# Patient Record
Sex: MALE | Race: AMERICAN INDIAN OR ALASKA NATIVE
[De-identification: names, ages, dates, MRNs, and addresses within clinical notes are randomized per-mention and may not be internally consistent; named-entity substitution may affect disease eponyms.]

---

## 2018-02-17 ENCOUNTER — HOSPITAL ENCOUNTER (EMERGENCY)
Dept: HOSPITAL 95 - ER | Age: 83
Discharge: HOME | End: 2018-02-17
Payer: MEDICARE

## 2018-02-17 VITALS — WEIGHT: 214.99 LBS | HEIGHT: 72 IN | BODY MASS INDEX: 29.12 KG/M2

## 2018-02-17 DIAGNOSIS — Z79.899: ICD-10-CM

## 2018-02-17 DIAGNOSIS — Z79.52: ICD-10-CM

## 2018-02-17 DIAGNOSIS — I10: ICD-10-CM

## 2018-02-17 DIAGNOSIS — Z79.4: ICD-10-CM

## 2018-02-17 DIAGNOSIS — Z88.5: ICD-10-CM

## 2018-02-17 DIAGNOSIS — E78.5: ICD-10-CM

## 2018-02-17 DIAGNOSIS — E11.40: Primary | ICD-10-CM

## 2018-02-17 LAB
ALBUMIN SERPL BCP-MCNC: 3.3 G/DL (ref 3.4–5)
ALBUMIN/GLOB SERPL: 0.8 {RATIO} (ref 0.8–1.8)
ALT SERPL W P-5'-P-CCNC: 22 U/L (ref 12–78)
ANION GAP SERPL CALCULATED.4IONS-SCNC: 7 MMOL/L (ref 6–16)
AST SERPL W P-5'-P-CCNC: 26 U/L (ref 12–37)
BASOPHILS # BLD AUTO: 0.02 K/MM3 (ref 0–0.23)
BASOPHILS NFR BLD AUTO: 0 % (ref 0–2)
BILIRUB SERPL-MCNC: 0.3 MG/DL (ref 0.1–1)
BUN SERPL-MCNC: 94 MG/DL (ref 8–24)
CALCIUM SERPL-MCNC: 8.7 MG/DL (ref 8.5–10.1)
CHLORIDE SERPL-SCNC: 98 MMOL/L (ref 98–108)
CO2 SERPL-SCNC: 34 MMOL/L (ref 21–32)
CREAT SERPL-MCNC: 2.58 MG/DL (ref 0.6–1.2)
DEPRECATED RDW RBC AUTO: 59.3 FL (ref 35.1–46.3)
EOSINOPHIL # BLD AUTO: 0.08 K/MM3 (ref 0–0.68)
EOSINOPHIL NFR BLD AUTO: 1 % (ref 0–6)
ERYTHROCYTE [DISTWIDTH] IN BLOOD BY AUTOMATED COUNT: 16.1 % (ref 11.7–14.2)
GLOBULIN SER CALC-MCNC: 4.3 G/DL (ref 2.2–4)
GLUCOSE SERPL-MCNC: 135 MG/DL (ref 70–99)
HCT VFR BLD AUTO: 30.1 % (ref 37–53)
HGB BLD-MCNC: 9.2 G/DL (ref 13.5–17.5)
IMM GRANULOCYTES # BLD AUTO: 0.03 K/MM3 (ref 0–0.1)
IMM GRANULOCYTES NFR BLD AUTO: 0 % (ref 0–1)
LYMPHOCYTES # BLD AUTO: 1.31 K/MM3 (ref 0.84–5.2)
LYMPHOCYTES NFR BLD AUTO: 19 % (ref 21–46)
MCHC RBC AUTO-ENTMCNC: 30.6 G/DL (ref 31.5–36.5)
MCV RBC AUTO: 100 FL (ref 80–100)
MONOCYTES # BLD AUTO: 0.59 K/MM3 (ref 0.16–1.47)
MONOCYTES NFR BLD AUTO: 8 % (ref 4–13)
NEUTROPHILS # BLD AUTO: 4.96 K/MM3 (ref 1.96–9.15)
NEUTROPHILS NFR BLD AUTO: 71 % (ref 41–73)
NRBC # BLD AUTO: 0 K/MM3 (ref 0–0.02)
NRBC BLD AUTO-RTO: 0 /100 WBC (ref 0–0.2)
PLATELET # BLD AUTO: 164 K/MM3 (ref 150–400)
POTASSIUM SERPL-SCNC: 4 MMOL/L (ref 3.5–5.5)
PROT SERPL-MCNC: 7.6 G/DL (ref 6.4–8.2)
SODIUM SERPL-SCNC: 139 MMOL/L (ref 136–145)

## 2018-02-24 ENCOUNTER — HOSPITAL ENCOUNTER (INPATIENT)
Dept: HOSPITAL 95 - ER | Age: 83
LOS: 5 days | Discharge: HOME | DRG: 255 | End: 2018-03-01
Attending: HOSPITALIST | Admitting: HOSPITALIST
Payer: MEDICARE

## 2018-02-24 VITALS — HEIGHT: 72.01 IN | WEIGHT: 194.23 LBS | BODY MASS INDEX: 26.31 KG/M2

## 2018-02-24 DIAGNOSIS — Z79.82: ICD-10-CM

## 2018-02-24 DIAGNOSIS — E86.1: ICD-10-CM

## 2018-02-24 DIAGNOSIS — L03.032: ICD-10-CM

## 2018-02-24 DIAGNOSIS — E11.69: ICD-10-CM

## 2018-02-24 DIAGNOSIS — E11.40: ICD-10-CM

## 2018-02-24 DIAGNOSIS — N18.3: ICD-10-CM

## 2018-02-24 DIAGNOSIS — N17.9: ICD-10-CM

## 2018-02-24 DIAGNOSIS — I21.4: ICD-10-CM

## 2018-02-24 DIAGNOSIS — E87.70: ICD-10-CM

## 2018-02-24 DIAGNOSIS — Z79.4: ICD-10-CM

## 2018-02-24 DIAGNOSIS — I12.9: ICD-10-CM
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
## 2018-02-24 DIAGNOSIS — I95.9: ICD-10-CM

## 2018-02-24 DIAGNOSIS — S90.425A: ICD-10-CM

## 2018-02-24 DIAGNOSIS — G92: ICD-10-CM

## 2018-02-24 DIAGNOSIS — E11.52: Primary | ICD-10-CM

## 2018-02-24 DIAGNOSIS — I96: ICD-10-CM

## 2018-02-24 LAB
ALBUMIN SERPL BCP-MCNC: 3.1 G/DL (ref 3.4–5)
ALBUMIN/GLOB SERPL: 0.7 {RATIO} (ref 0.8–1.8)
ALT SERPL W P-5'-P-CCNC: 21 U/L (ref 12–78)
ANION GAP SERPL CALCULATED.4IONS-SCNC: 12 MMOL/L (ref 6–16)
AST SERPL W P-5'-P-CCNC: 42 U/L (ref 12–37)
BASOPHILS # BLD AUTO: 0.02 K/MM3 (ref 0–0.23)
BASOPHILS NFR BLD AUTO: 0 % (ref 0–2)
BILIRUB SERPL-MCNC: 0.4 MG/DL (ref 0.1–1)
BUN SERPL-MCNC: 147 MG/DL (ref 8–24)
CALCIUM SERPL-MCNC: 8.4 MG/DL (ref 8.5–10.1)
CHLORIDE SERPL-SCNC: 95 MMOL/L (ref 98–108)
CO2 SERPL-SCNC: 33 MMOL/L (ref 21–32)
CREAT SERPL-MCNC: 5.29 MG/DL (ref 0.6–1.2)
DEPRECATED RDW RBC AUTO: 62.8 FL (ref 35.1–46.3)
EOSINOPHIL # BLD AUTO: 0.07 K/MM3 (ref 0–0.68)
EOSINOPHIL NFR BLD AUTO: 1 % (ref 0–6)
ERYTHROCYTE [DISTWIDTH] IN BLOOD BY AUTOMATED COUNT: 17.5 % (ref 11.7–14.2)
GLOBULIN SER CALC-MCNC: 4.3 G/DL (ref 2.2–4)
GLUCOSE SERPL-MCNC: 168 MG/DL (ref 70–99)
HCT VFR BLD AUTO: 28.7 % (ref 37–53)
HGB BLD-MCNC: 8.9 G/DL (ref 13.5–17.5)
IMM GRANULOCYTES # BLD AUTO: 0.02 K/MM3 (ref 0–0.1)
IMM GRANULOCYTES NFR BLD AUTO: 0 % (ref 0–1)
LYMPHOCYTES # BLD AUTO: 1.19 K/MM3 (ref 0.84–5.2)
LYMPHOCYTES NFR BLD AUTO: 16 % (ref 21–46)
MCHC RBC AUTO-ENTMCNC: 31 G/DL (ref 31.5–36.5)
MCV RBC AUTO: 102 FL (ref 80–100)
MONOCYTES # BLD AUTO: 0.8 K/MM3 (ref 0.16–1.47)
MONOCYTES NFR BLD AUTO: 11 % (ref 4–13)
NEUTROPHILS # BLD AUTO: 5.54 K/MM3 (ref 1.96–9.15)
NEUTROPHILS NFR BLD AUTO: 72 % (ref 41–73)
NRBC # BLD AUTO: 0 K/MM3 (ref 0–0.02)
NRBC BLD AUTO-RTO: 0 /100 WBC (ref 0–0.2)
PLATELET # BLD AUTO: 187 K/MM3 (ref 150–400)
POTASSIUM SERPL-SCNC: 4 MMOL/L (ref 3.5–5.5)
PROT SERPL-MCNC: 7.4 G/DL (ref 6.4–8.2)
PROTHROMBIN TIME: 11.4 SEC (ref 9.7–11.5)
SODIUM SERPL-SCNC: 140 MMOL/L (ref 136–145)
TROPONIN I SERPL-MCNC: 0.12 NG/ML (ref 0–0.04)

## 2018-02-25 LAB
ALBUMIN SERPL BCP-MCNC: 3 G/DL (ref 3.4–5)
ALBUMIN/GLOB SERPL: 0.7 {RATIO} (ref 0.8–1.8)
ALT SERPL W P-5'-P-CCNC: 20 U/L (ref 12–78)
ANION GAP SERPL CALCULATED.4IONS-SCNC: 12 MMOL/L (ref 6–16)
AST SERPL W P-5'-P-CCNC: 43 U/L (ref 12–37)
BILIRUB SERPL-MCNC: 0.4 MG/DL (ref 0.1–1)
BUN SERPL-MCNC: 148 MG/DL (ref 8–24)
CALCIUM SERPL-MCNC: 8.4 MG/DL (ref 8.5–10.1)
CHLORIDE SERPL-SCNC: 97 MMOL/L (ref 98–108)
CHOLEST SERPL-MCNC: 164 MG/DL (ref 50–200)
CHOLEST/HDLC SERPL: 3.1 {RATIO}
CK MB CFR SERPL CALC: 3 % (ref 0–4)
CK SERPL-CCNC: 422 U/L (ref 39–308)
CO2 SERPL-SCNC: 32 MMOL/L (ref 21–32)
CREAT SERPL-MCNC: 4.43 MG/DL (ref 0.6–1.2)
DEPRECATED RDW RBC AUTO: 63.7 FL (ref 35.1–46.3)
ERYTHROCYTE [DISTWIDTH] IN BLOOD BY AUTOMATED COUNT: 17.8 % (ref 11.7–14.2)
GLOBULIN SER CALC-MCNC: 4.2 G/DL (ref 2.2–4)
GLUCOSE SERPL-MCNC: 180 MG/DL (ref 70–99)
HCT VFR BLD AUTO: 28 % (ref 37–53)
HDLC SERPL-MCNC: 53 MG/DL (ref 39–?)
HGB BLD-MCNC: 8.9 G/DL (ref 13.5–17.5)
LDLC SERPL CALC-MCNC: 97 MG/DL (ref 0–110)
LDLC/HDLC SERPL: 1.8 {RATIO}
MCHC RBC AUTO-ENTMCNC: 31.8 G/DL (ref 31.5–36.5)
MCV RBC AUTO: 101 FL (ref 80–100)
NRBC # BLD AUTO: 0 K/MM3 (ref 0–0.02)
NRBC BLD AUTO-RTO: 0 /100 WBC (ref 0–0.2)
PLATELET # BLD AUTO: 151 K/MM3 (ref 150–400)
POTASSIUM SERPL-SCNC: 3.7 MMOL/L (ref 3.5–5.5)
PROT SERPL-MCNC: 7.2 G/DL (ref 6.4–8.2)
RBC #/AREA URNS HPF: (no result) /HPF (ref 0–2)
SODIUM SERPL-SCNC: 141 MMOL/L (ref 136–145)
SP GR SPEC: 1.01 (ref 1–1.02)
TRIGL SERPL-MCNC: 68 MG/DL (ref 30–160)
TROPONIN I SERPL-MCNC: 1.68 NG/ML (ref 0–0.04)
UROBILINOGEN UR STRIP-MCNC: (no result) MG/DL
VANCOMYCIN SERPL-MCNC: 16.7 UG/ML
VLDLC SERPL CALC-MCNC: 13 MG/DL (ref 6–32)

## 2018-02-26 LAB
ALBUMIN SERPL BCP-MCNC: 3.1 G/DL (ref 3.4–5)
ANION GAP SERPL CALCULATED.4IONS-SCNC: 11 MMOL/L (ref 6–16)
BUN SERPL-MCNC: 104 MG/DL (ref 8–24)
CALCIUM SERPL-MCNC: 8.6 MG/DL (ref 8.5–10.1)
CHLORIDE SERPL-SCNC: 101 MMOL/L (ref 98–108)
CHOLEST SERPL-MCNC: 176 MG/DL (ref 50–200)
CHOLEST/HDLC SERPL: 3.2 {RATIO}
CK SERPL-CCNC: 944 U/L (ref 39–308)
CO2 SERPL-SCNC: 31 MMOL/L (ref 21–32)
CREAT SERPL-MCNC: 2.77 MG/DL (ref 0.6–1.2)
GLUCOSE SERPL-MCNC: 185 MG/DL (ref 70–99)
HCT VFR BLD AUTO: 30.2 % (ref 37–53)
HDLC SERPL-MCNC: 55 MG/DL (ref 39–?)
HGB BLD-MCNC: 9.4 G/DL (ref 13.5–17.5)
LDLC SERPL CALC-MCNC: 99 MG/DL (ref 0–110)
LDLC/HDLC SERPL: 1.8 {RATIO}
MAGNESIUM SERPL-MCNC: 2.2 MG/DL (ref 1.6–2.4)
PHOSPHATE SERPL-MCNC: 3.1 MG/DL (ref 2.5–4.9)
POTASSIUM SERPL-SCNC: 3.5 MMOL/L (ref 3.5–5.5)
PROT UR-MCNC: 21.9 MG/DL (ref 0–11.9)
SODIUM SERPL-SCNC: 143 MMOL/L (ref 136–145)
TRIGL SERPL-MCNC: 111 MG/DL (ref 30–160)
URATE SERPL-MCNC: 12.2 MG/DL (ref 3.5–7.2)
VANCOMYCIN SERPL-MCNC: 21.2 UG/ML
VLDLC SERPL CALC-MCNC: 22 MG/DL (ref 6–32)

## 2018-02-27 LAB
ALBUMIN MFR UR ELPH: 48.4 % (ref 45–80)
ALBUMIN SERPL BCP-MCNC: 2.7 G/DL (ref 3.4–5)
ALBUMIN SERPL-MCNC: 3.3 G/DL (ref 3.3–4.8)
ANCA: (no result)
ANION GAP SERPL CALCULATED.4IONS-SCNC: 7 MMOL/L (ref 6–16)
BUN SERPL-MCNC: 82 MG/DL (ref 8–24)
CALCIUM SERPL-MCNC: 8.4 MG/DL (ref 8.5–10.1)
CHLORIDE SERPL-SCNC: 103 MMOL/L (ref 98–108)
CO2 SERPL-SCNC: 33 MMOL/L (ref 21–32)
CREAT SERPL-MCNC: 2.32 MG/DL (ref 0.6–1.2)
GLUCOSE SERPL-MCNC: 155 MG/DL (ref 70–99)
HCT VFR BLD AUTO: 29.2 % (ref 37–53)
HGB BLD-MCNC: 8.9 G/DL (ref 13.5–17.5)
MAGNESIUM SERPL-MCNC: 2.2 MG/DL (ref 1.6–2.4)
PHOSPHATE SERPL-MCNC: 2.6 MG/DL (ref 2.5–4.9)
POTASSIUM SERPL-SCNC: 3.7 MMOL/L (ref 3.5–5.5)
SODIUM SERPL-SCNC: 143 MMOL/L (ref 136–145)
VANCOMYCIN SERPL-MCNC: 12.1 UG/ML

## 2018-02-28 LAB
ALBUMIN SERPL BCP-MCNC: 2.7 G/DL (ref 3.4–5)
ANION GAP SERPL CALCULATED.4IONS-SCNC: 8 MMOL/L (ref 6–16)
BUN SERPL-MCNC: 63 MG/DL (ref 8–24)
CALCIUM SERPL-MCNC: 8.4 MG/DL (ref 8.5–10.1)
CHLORIDE SERPL-SCNC: 102 MMOL/L (ref 98–108)
CO2 SERPL-SCNC: 31 MMOL/L (ref 21–32)
CREAT SERPL-MCNC: 2.1 MG/DL (ref 0.6–1.2)
GLUCOSE SERPL-MCNC: 186 MG/DL (ref 70–99)
HCT VFR BLD AUTO: 28.8 % (ref 37–53)
HGB BLD-MCNC: 8.7 G/DL (ref 13.5–17.5)
MAGNESIUM SERPL-MCNC: 2 MG/DL (ref 1.6–2.4)
PHOSPHATE SERPL-MCNC: 2.5 MG/DL (ref 2.5–4.9)
POTASSIUM SERPL-SCNC: 3.5 MMOL/L (ref 3.5–5.5)
SODIUM SERPL-SCNC: 141 MMOL/L (ref 136–145)
VANCOMYCIN SERPL-MCNC: 15.3 UG/ML

## 2018-02-28 PROCEDURE — 0Y6T0Z1 DETACHMENT AT RIGHT 3RD TOE, HIGH, OPEN APPROACH: ICD-10-PCS | Performed by: STUDENT IN AN ORGANIZED HEALTH CARE EDUCATION/TRAINING PROGRAM

## 2018-03-01 LAB
ALBUMIN SERPL BCP-MCNC: 2.7 G/DL (ref 3.4–5)
ANION GAP SERPL CALCULATED.4IONS-SCNC: 8 MMOL/L (ref 6–16)
BUN SERPL-MCNC: 52 MG/DL (ref 8–24)
CALCIUM SERPL-MCNC: 8.5 MG/DL (ref 8.5–10.1)
CHLORIDE SERPL-SCNC: 99 MMOL/L (ref 98–108)
CO2 SERPL-SCNC: 32 MMOL/L (ref 21–32)
CREAT SERPL-MCNC: 2.07 MG/DL (ref 0.6–1.2)
GLUCOSE SERPL-MCNC: 269 MG/DL (ref 70–99)
HCT VFR BLD AUTO: 31 % (ref 37–53)
HGB BLD-MCNC: 9.4 G/DL (ref 13.5–17.5)
MAGNESIUM SERPL-MCNC: 1.8 MG/DL (ref 1.6–2.4)
PHOSPHATE SERPL-MCNC: 3 MG/DL (ref 2.5–4.9)
POTASSIUM SERPL-SCNC: 3.8 MMOL/L (ref 3.5–5.5)
SODIUM SERPL-SCNC: 139 MMOL/L (ref 136–145)
VANCOMYCIN SERPL-MCNC: 15.8 UG/ML

## 2018-03-27 ENCOUNTER — HOSPITAL ENCOUNTER (INPATIENT)
Dept: HOSPITAL 95 - ER | Age: 83
LOS: 3 days | Discharge: HOME | DRG: 683 | End: 2018-03-30
Attending: HOSPITALIST | Admitting: HOSPITALIST
Payer: MEDICARE

## 2018-03-27 VITALS — WEIGHT: 190.92 LBS | HEIGHT: 72.01 IN | BODY MASS INDEX: 25.86 KG/M2

## 2018-03-27 DIAGNOSIS — Z79.52: ICD-10-CM

## 2018-03-27 DIAGNOSIS — N18.9: ICD-10-CM

## 2018-03-27 DIAGNOSIS — E11.51: ICD-10-CM

## 2018-03-27 DIAGNOSIS — D63.1: ICD-10-CM

## 2018-03-27 DIAGNOSIS — E86.9: ICD-10-CM

## 2018-03-27 DIAGNOSIS — R33.9: ICD-10-CM

## 2018-03-27 DIAGNOSIS — I25.2: ICD-10-CM

## 2018-03-27 DIAGNOSIS — E83.39: ICD-10-CM

## 2018-03-27 DIAGNOSIS — E21.3: ICD-10-CM

## 2018-03-27 DIAGNOSIS — N17.9: Primary | ICD-10-CM

## 2018-03-27 DIAGNOSIS — Z79.84: ICD-10-CM

## 2018-03-27 DIAGNOSIS — E88.09: ICD-10-CM

## 2018-03-27 DIAGNOSIS — N28.1: ICD-10-CM

## 2018-03-27 DIAGNOSIS — M31.5: ICD-10-CM

## 2018-03-27 DIAGNOSIS — E11.649: ICD-10-CM

## 2018-03-27 DIAGNOSIS — Z79.899: ICD-10-CM

## 2018-03-27 DIAGNOSIS — I12.9: ICD-10-CM

## 2018-03-27 DIAGNOSIS — E87.1: ICD-10-CM

## 2018-03-27 DIAGNOSIS — E11.22: ICD-10-CM

## 2018-03-27 DIAGNOSIS — Z79.02: ICD-10-CM

## 2018-03-27 DIAGNOSIS — Z79.4: ICD-10-CM

## 2018-03-27 DIAGNOSIS — Z79.82: ICD-10-CM

## 2018-03-27 LAB
KETONES UR STRIP-MCNC: (no result) MG/DL
SP GR SPEC: 1.01 (ref 1–1.02)
UROBILINOGEN UR STRIP-MCNC: (no result) MG/DL

## 2018-03-27 PROCEDURE — G0378 HOSPITAL OBSERVATION PER HR: HCPCS

## 2018-03-28 LAB
ANION GAP SERPL CALCULATED.4IONS-SCNC: 10 MMOL/L (ref 6–16)
BUN SERPL-MCNC: 150 MG/DL (ref 8–24)
CALCIUM SERPL-MCNC: 8.4 MG/DL (ref 8.5–10.1)
CHLORIDE SERPL-SCNC: 95 MMOL/L (ref 98–108)
CO2 SERPL-SCNC: 34 MMOL/L (ref 21–32)
CREAT SERPL-MCNC: 3.9 MG/DL (ref 0.6–1.2)
GLUCOSE SERPL-MCNC: 176 MG/DL (ref 70–99)
MAGNESIUM SERPL-MCNC: 2.4 MG/DL (ref 1.6–2.4)
PHOSPHATE SERPL-MCNC: 5.3 MG/DL (ref 2.5–4.9)
POTASSIUM SERPL-SCNC: 4.1 MMOL/L (ref 3.5–5.5)
SODIUM SERPL-SCNC: 139 MMOL/L (ref 136–145)

## 2018-03-29 LAB
ALBUMIN SERPL BCP-MCNC: 2.7 G/DL (ref 3.4–5)
ANION GAP SERPL CALCULATED.4IONS-SCNC: 7 MMOL/L (ref 6–16)
BUN SERPL-MCNC: 109 MG/DL (ref 8–24)
CALCIUM SERPL-MCNC: 8.3 MG/DL (ref 8.5–10.1)
CHLORIDE SERPL-SCNC: 101 MMOL/L (ref 98–108)
CO2 SERPL-SCNC: 33 MMOL/L (ref 21–32)
CREAT SERPL-MCNC: 2.52 MG/DL (ref 0.6–1.2)
GLUCOSE SERPL-MCNC: 47 MG/DL (ref 70–99)
HCT VFR BLD AUTO: 32.2 % (ref 37–53)
HGB BLD-MCNC: 10 G/DL (ref 13.5–17.5)
MAGNESIUM SERPL-MCNC: 2.3 MG/DL (ref 1.6–2.4)
PHOSPHATE SERPL-MCNC: 3.4 MG/DL (ref 2.5–4.9)
POTASSIUM SERPL-SCNC: 3.5 MMOL/L (ref 3.5–5.5)
PSA SERPL-MCNC: 0.98 NG/ML (ref 0–4)
SODIUM SERPL-SCNC: 141 MMOL/L (ref 136–145)

## 2018-03-30 LAB
ALBUMIN SERPL BCP-MCNC: 2.6 G/DL (ref 3.4–5)
ANION GAP SERPL CALCULATED.4IONS-SCNC: 6 MMOL/L (ref 6–16)
BUN SERPL-MCNC: 73 MG/DL (ref 8–24)
CALCIUM SERPL-MCNC: 8.7 MG/DL (ref 8.5–10.1)
CHLORIDE SERPL-SCNC: 103 MMOL/L (ref 98–108)
CO2 SERPL-SCNC: 32 MMOL/L (ref 21–32)
CREAT SERPL-MCNC: 1.81 MG/DL (ref 0.6–1.2)
GLUCOSE SERPL-MCNC: 173 MG/DL (ref 70–99)
HCT VFR BLD AUTO: 32.7 % (ref 37–53)
HGB BLD-MCNC: 10.1 G/DL (ref 13.5–17.5)
MAGNESIUM SERPL-MCNC: 2.1 MG/DL (ref 1.6–2.4)
PHOSPHATE SERPL-MCNC: 2.3 MG/DL (ref 2.5–4.9)
POTASSIUM SERPL-SCNC: 4.6 MMOL/L (ref 3.5–5.5)
SODIUM SERPL-SCNC: 141 MMOL/L (ref 136–145)

## 2019-03-13 ENCOUNTER — HOSPITAL ENCOUNTER (OUTPATIENT)
Dept: HOSPITAL 95 - MHTC | Age: 84
Discharge: HOME | End: 2019-03-13
Attending: RADIOLOGY
Payer: MEDICARE

## 2019-03-13 VITALS — HEIGHT: 70 IN | BODY MASS INDEX: 27.77 KG/M2 | WEIGHT: 194.01 LBS

## 2019-03-13 DIAGNOSIS — D63.1: ICD-10-CM

## 2019-03-13 DIAGNOSIS — I73.9: Primary | ICD-10-CM

## 2019-03-13 DIAGNOSIS — I10: ICD-10-CM

## 2019-03-13 DIAGNOSIS — E11.9: ICD-10-CM

## 2019-03-13 DIAGNOSIS — Z87.891: ICD-10-CM

## 2019-03-13 DIAGNOSIS — Z79.84: ICD-10-CM

## 2019-03-13 DIAGNOSIS — G47.33: ICD-10-CM

## 2019-03-13 DIAGNOSIS — N18.9: ICD-10-CM

## 2019-03-13 DIAGNOSIS — Z89.422: ICD-10-CM

## 2019-03-13 DIAGNOSIS — K21.9: ICD-10-CM

## 2019-03-13 PROCEDURE — C1884 EMBOLIZATION PROTECT SYST: HCPCS

## 2019-03-13 PROCEDURE — C1887 CATHETER, GUIDING: HCPCS

## 2019-03-13 PROCEDURE — C1760 CLOSURE DEV, VASC: HCPCS

## 2019-03-13 PROCEDURE — C1714 CATH, TRANS ATHERECTOMY, DIR: HCPCS

## 2019-03-13 PROCEDURE — C1769 GUIDE WIRE: HCPCS

## 2019-03-13 PROCEDURE — C2623 CATH, TRANSLUMIN, DRUG-COAT: HCPCS

## 2019-03-13 PROCEDURE — C1894 INTRO/SHEATH, NON-LASER: HCPCS

## 2019-03-13 PROCEDURE — C1725 CATH, TRANSLUMIN NON-LASER: HCPCS

## 2019-03-13 NOTE — NUR
PT VERBALIZED UNDERSTANDING OF D/C INSTRUCTIONS. RFE GROIN SITE APPEARS TO BE
STABLE AFTER AMBULATION AND GETTING SELF DRESSED. PT IV REMOVED FROM RAC WITH
CATH INTACT, PRESSURE DRESSING APPLIED. PT WIFE AND DAUGHTER TO DRIVE PT HOME.
NADN AT TIME OF DISPO. VSS. PT TAKEN OUT TO VEHICLE VIA W/C.

## 2019-10-01 ENCOUNTER — HOSPITAL ENCOUNTER (OUTPATIENT)
Dept: HOSPITAL 95 - MHTC | Age: 84
Discharge: HOME | End: 2019-10-01
Attending: RADIOLOGY
Payer: MEDICARE

## 2019-10-01 VITALS — HEIGHT: 72.01 IN | WEIGHT: 189.6 LBS | BODY MASS INDEX: 25.68 KG/M2

## 2019-10-01 DIAGNOSIS — I12.9: ICD-10-CM

## 2019-10-01 DIAGNOSIS — Z79.899: ICD-10-CM

## 2019-10-01 DIAGNOSIS — Z79.02: ICD-10-CM

## 2019-10-01 DIAGNOSIS — Z86.73: ICD-10-CM

## 2019-10-01 DIAGNOSIS — Z79.84: ICD-10-CM

## 2019-10-01 DIAGNOSIS — E11.51: Primary | ICD-10-CM

## 2019-10-01 DIAGNOSIS — Z79.82: ICD-10-CM

## 2019-10-01 DIAGNOSIS — D63.1: ICD-10-CM

## 2019-10-01 DIAGNOSIS — I70.211: ICD-10-CM

## 2019-10-01 DIAGNOSIS — N18.9: ICD-10-CM

## 2019-10-01 DIAGNOSIS — E11.22: ICD-10-CM

## 2019-10-01 DIAGNOSIS — Z88.5: ICD-10-CM

## 2019-10-01 DIAGNOSIS — K21.9: ICD-10-CM

## 2019-10-01 DIAGNOSIS — G47.33: ICD-10-CM

## 2019-10-01 DIAGNOSIS — Z79.52: ICD-10-CM

## 2019-10-01 PROCEDURE — C1874 STENT, COATED/COV W/DEL SYS: HCPCS

## 2019-10-01 PROCEDURE — C1725 CATH, TRANSLUMIN NON-LASER: HCPCS

## 2019-10-01 PROCEDURE — C1760 CLOSURE DEV, VASC: HCPCS

## 2019-10-01 PROCEDURE — C1887 CATHETER, GUIDING: HCPCS

## 2019-10-01 PROCEDURE — C1769 GUIDE WIRE: HCPCS

## 2019-10-01 PROCEDURE — C2623 CATH, TRANSLUMIN, DRUG-COAT: HCPCS

## 2019-10-01 PROCEDURE — C1714 CATH, TRANS ATHERECTOMY, DIR: HCPCS

## 2019-10-01 PROCEDURE — C1894 INTRO/SHEATH, NON-LASER: HCPCS

## 2019-10-01 PROCEDURE — C1884 EMBOLIZATION PROTECT SYST: HCPCS

## 2019-10-01 NOTE — NUR
PT UP TO RESTROOM, SLOW STEADY GAIT. AMBULATES AROUND RECOVERY ROOM, GETS
DRESSED WITH LIMITED ASSISTANCE FROM WIFE. LEFT GROIN SITE REMAINS SOFT
NON-TENDER WITH NO ACTIVE BLEEDING.

## 2019-10-01 NOTE — NUR
LEFT GROIN SITE REMAINS STABLE. PT VERBALIZED UNDERSTANDING OF D/C
INSTRUCTIONS. DISCHARGE PAPERWORK PROVIDED IN [facility]. IV
REMOVED FROM LFA WITH CATH INTACT, PRESSURE DRESSING APPLIED. PT DENIES NEED
FOR W/C RIDE TO PRIVATE VEHICLE. PT AND HIS WIFE AMBULATE TO PRIVATE VEHICLE
WITH NADN. ENCOURAGED TO FOLLOW UP WITH PROVIDER AS SCHEDULED.

## 2021-09-13 ENCOUNTER — HOSPITAL ENCOUNTER (OUTPATIENT)
Dept: HOSPITAL 95 - LAB | Age: 86
Discharge: HOME | End: 2021-09-13
Attending: PHYSICIAN ASSISTANT
Payer: MEDICARE

## 2021-09-13 DIAGNOSIS — R10.9: Primary | ICD-10-CM

## 2021-09-13 LAB
BASOPHILS # BLD AUTO: 0.02 K/MM3 (ref 0–0.23)
BASOPHILS NFR BLD AUTO: 0 % (ref 0–2)
DEPRECATED RDW RBC AUTO: 42.6 FL (ref 35.1–46.3)
EOSINOPHIL # BLD AUTO: 0.06 K/MM3 (ref 0–0.68)
EOSINOPHIL NFR BLD AUTO: 1 % (ref 0–6)
ERYTHROCYTE [DISTWIDTH] IN BLOOD BY AUTOMATED COUNT: 12.5 % (ref 11.7–14.2)
HCT VFR BLD AUTO: 39.6 % (ref 37–53)
HGB BLD-MCNC: 13.1 G/DL (ref 13.5–17.5)
IMM GRANULOCYTES # BLD AUTO: 0.01 K/MM3 (ref 0–0.1)
IMM GRANULOCYTES NFR BLD AUTO: 0 % (ref 0–1)
LYMPHOCYTES # BLD AUTO: 1.21 K/MM3 (ref 0.84–5.2)
LYMPHOCYTES NFR BLD AUTO: 21 % (ref 21–46)
MCHC RBC AUTO-ENTMCNC: 33.1 G/DL (ref 31.5–36.5)
MCV RBC AUTO: 94 FL (ref 80–100)
MONOCYTES # BLD AUTO: 0.58 K/MM3 (ref 0.16–1.47)
MONOCYTES NFR BLD AUTO: 10 % (ref 4–13)
NEUTROPHILS # BLD AUTO: 3.83 K/MM3 (ref 1.96–9.15)
NEUTROPHILS NFR BLD AUTO: 67 % (ref 41–73)
NRBC # BLD AUTO: 0 K/MM3 (ref 0–0.02)
NRBC BLD AUTO-RTO: 0 /100 WBC (ref 0–0.2)
PLATELET # BLD AUTO: 149 K/MM3 (ref 150–400)

## 2021-09-17 ENCOUNTER — HOSPITAL ENCOUNTER (EMERGENCY)
Dept: HOSPITAL 95 - ER | Age: 86
Discharge: HOME | End: 2021-09-17
Payer: MEDICARE

## 2021-09-17 VITALS — BODY MASS INDEX: 23.03 KG/M2 | WEIGHT: 170 LBS | HEIGHT: 72 IN

## 2021-09-17 DIAGNOSIS — I25.2: ICD-10-CM

## 2021-09-17 DIAGNOSIS — Z88.5: ICD-10-CM

## 2021-09-17 DIAGNOSIS — K85.90: Primary | ICD-10-CM

## 2021-09-17 DIAGNOSIS — Z79.84: ICD-10-CM

## 2021-09-17 DIAGNOSIS — Z79.899: ICD-10-CM

## 2021-09-17 DIAGNOSIS — Z79.82: ICD-10-CM

## 2021-09-17 LAB
ALBUMIN SERPL BCP-MCNC: 3.3 G/DL (ref 3.4–5)
ALBUMIN/GLOB SERPL: 0.8 {RATIO} (ref 0.8–1.8)
ALT SERPL W P-5'-P-CCNC: 17 U/L (ref 12–78)
ANION GAP SERPL CALCULATED.4IONS-SCNC: 0 MMOL/L (ref 6–16)
AST SERPL W P-5'-P-CCNC: 17 U/L (ref 12–37)
BASOPHILS # BLD AUTO: 0.01 K/MM3 (ref 0–0.23)
BASOPHILS NFR BLD AUTO: 0 % (ref 0–2)
BILIRUB SERPL-MCNC: 0.4 MG/DL (ref 0.1–1)
BUN SERPL-MCNC: 30 MG/DL (ref 8–24)
CALCIUM SERPL-MCNC: 8.7 MG/DL (ref 8.5–10.1)
CHLORIDE SERPL-SCNC: 107 MMOL/L (ref 98–108)
CO2 SERPL-SCNC: 33 MMOL/L (ref 21–32)
CREAT SERPL-MCNC: 1.26 MG/DL (ref 0.6–1.2)
DEPRECATED RDW RBC AUTO: 42.3 FL (ref 35.1–46.3)
EOSINOPHIL # BLD AUTO: 0.04 K/MM3 (ref 0–0.68)
EOSINOPHIL NFR BLD AUTO: 1 % (ref 0–6)
ERYTHROCYTE [DISTWIDTH] IN BLOOD BY AUTOMATED COUNT: 12.4 % (ref 11.7–14.2)
GLOBULIN SER CALC-MCNC: 4.3 G/DL (ref 2.2–4)
GLUCOSE SERPL-MCNC: 204 MG/DL (ref 70–99)
HCT VFR BLD AUTO: 36.9 % (ref 37–53)
HGB BLD-MCNC: 12.2 G/DL (ref 13.5–17.5)
IMM GRANULOCYTES # BLD AUTO: 0.02 K/MM3 (ref 0–0.1)
IMM GRANULOCYTES NFR BLD AUTO: 0 % (ref 0–1)
LYMPHOCYTES # BLD AUTO: 1.18 K/MM3 (ref 0.84–5.2)
LYMPHOCYTES NFR BLD AUTO: 18 % (ref 21–46)
MCHC RBC AUTO-ENTMCNC: 33.1 G/DL (ref 31.5–36.5)
MCV RBC AUTO: 93 FL (ref 80–100)
MONOCYTES # BLD AUTO: 0.46 K/MM3 (ref 0.16–1.47)
MONOCYTES NFR BLD AUTO: 7 % (ref 4–13)
NEUTROPHILS # BLD AUTO: 5.02 K/MM3 (ref 1.96–9.15)
NEUTROPHILS NFR BLD AUTO: 75 % (ref 41–73)
NRBC # BLD AUTO: 0 K/MM3 (ref 0–0.02)
NRBC BLD AUTO-RTO: 0 /100 WBC (ref 0–0.2)
PLATELET # BLD AUTO: 183 K/MM3 (ref 150–400)
POTASSIUM SERPL-SCNC: 4.6 MMOL/L (ref 3.5–5.5)
PROT SERPL-MCNC: 7.6 G/DL (ref 6.4–8.2)
SODIUM SERPL-SCNC: 140 MMOL/L (ref 136–145)

## 2021-09-17 PROCEDURE — A9270 NON-COVERED ITEM OR SERVICE: HCPCS

## 2022-09-29 ENCOUNTER — HOSPITAL ENCOUNTER (OUTPATIENT)
Dept: HOSPITAL 95 - LAB | Age: 87
Discharge: HOME | End: 2022-09-29
Attending: FAMILY MEDICINE
Payer: MEDICARE

## 2022-09-29 DIAGNOSIS — R60.0: ICD-10-CM

## 2022-09-29 DIAGNOSIS — R07.89: Primary | ICD-10-CM

## 2022-09-29 LAB
ALBUMIN SERPL BCP-MCNC: 3.5 G/DL (ref 3.4–5)
ALBUMIN/GLOB SERPL: 0.8 {RATIO} (ref 0.8–1.8)
ALT SERPL W P-5'-P-CCNC: 21 U/L (ref 12–78)
ANION GAP SERPL CALCULATED.4IONS-SCNC: 6 MMOL/L (ref 6–16)
AST SERPL W P-5'-P-CCNC: 20 U/L (ref 12–37)
BASOPHILS # BLD AUTO: 0.01 K/MM3 (ref 0–0.23)
BASOPHILS NFR BLD AUTO: 0 % (ref 0–2)
BILIRUB SERPL-MCNC: 0.3 MG/DL (ref 0.1–1)
BUN SERPL-MCNC: 61 MG/DL (ref 8–24)
CALCIUM SERPL-MCNC: 9.3 MG/DL (ref 8.5–10.1)
CHLORIDE SERPL-SCNC: 99 MMOL/L (ref 98–108)
CO2 SERPL-SCNC: 31 MMOL/L (ref 21–32)
CREAT SERPL-MCNC: 2.29 MG/DL (ref 0.6–1.2)
DEPRECATED RDW RBC AUTO: 51.4 FL (ref 35.1–46.3)
EOSINOPHIL # BLD AUTO: 0.05 K/MM3 (ref 0–0.68)
EOSINOPHIL NFR BLD AUTO: 1 % (ref 0–6)
ERYTHROCYTE [DISTWIDTH] IN BLOOD BY AUTOMATED COUNT: 14.4 % (ref 11.7–14.2)
GLOBULIN SER CALC-MCNC: 4.4 G/DL (ref 2.2–4)
GLUCOSE SERPL-MCNC: 148 MG/DL (ref 70–99)
HCT VFR BLD AUTO: 35.8 % (ref 37–53)
HGB BLD-MCNC: 12 G/DL (ref 13.5–17.5)
IMM GRANULOCYTES # BLD AUTO: 0.01 K/MM3 (ref 0–0.1)
IMM GRANULOCYTES NFR BLD AUTO: 0 % (ref 0–1)
LYMPHOCYTES # BLD AUTO: 0.96 K/MM3 (ref 0.84–5.2)
LYMPHOCYTES NFR BLD AUTO: 23 % (ref 21–46)
MCHC RBC AUTO-ENTMCNC: 33.5 G/DL (ref 31.5–36.5)
MCV RBC AUTO: 101 FL (ref 80–100)
MONOCYTES # BLD AUTO: 0.45 K/MM3 (ref 0.16–1.47)
MONOCYTES NFR BLD AUTO: 11 % (ref 4–13)
NEUTROPHILS # BLD AUTO: 2.71 K/MM3 (ref 1.96–9.15)
NEUTROPHILS NFR BLD AUTO: 65 % (ref 41–73)
NRBC # BLD AUTO: 0 K/MM3 (ref 0–0.02)
NRBC BLD AUTO-RTO: 0 /100 WBC (ref 0–0.2)
PLATELET # BLD AUTO: 185 K/MM3 (ref 150–400)
POTASSIUM SERPL-SCNC: 4.9 MMOL/L (ref 3.5–5.5)
PROT SERPL-MCNC: 7.9 G/DL (ref 6.4–8.2)
SODIUM SERPL-SCNC: 136 MMOL/L (ref 136–145)

## 2023-03-10 ENCOUNTER — HOSPITAL ENCOUNTER (OUTPATIENT)
Dept: HOSPITAL 95 - LAB SHORT | Age: 88
Discharge: HOME | End: 2023-03-10
Attending: NURSE PRACTITIONER
Payer: MEDICARE

## 2023-03-10 DIAGNOSIS — R19.7: Primary | ICD-10-CM

## 2023-07-13 ENCOUNTER — HOSPITAL ENCOUNTER (OUTPATIENT)
Dept: HOSPITAL 95 - LAB | Age: 88
Discharge: HOME | End: 2023-07-13
Attending: PHYSICIAN ASSISTANT
Payer: MEDICARE

## 2023-07-13 DIAGNOSIS — R42: Primary | ICD-10-CM

## 2023-07-13 LAB
ALBUMIN SERPL BCP-MCNC: 3.4 G/DL (ref 3.4–5)
ALBUMIN/GLOB SERPL: 0.8 {RATIO} (ref 0.8–1.8)
ALT SERPL W P-5'-P-CCNC: 58 U/L (ref 12–78)
ANION GAP SERPL CALCULATED.4IONS-SCNC: 9 MMOL/L (ref 6–16)
AST SERPL W P-5'-P-CCNC: 73 U/L (ref 12–37)
BASOPHILS # BLD AUTO: 0.02 K/MM3 (ref 0–0.23)
BASOPHILS NFR BLD AUTO: 1 % (ref 0–2)
BILIRUB SERPL-MCNC: 0.3 MG/DL (ref 0.1–1)
BUN SERPL-MCNC: 81 MG/DL (ref 8–24)
CALCIUM SERPL-MCNC: 8.6 MG/DL (ref 8.5–10.1)
CHLORIDE SERPL-SCNC: 101 MMOL/L (ref 98–108)
CO2 SERPL-SCNC: 28 MMOL/L (ref 21–32)
CREAT SERPL-MCNC: 3.3 MG/DL (ref 0.6–1.2)
DEPRECATED RDW RBC AUTO: 58.2 FL (ref 35.1–46.3)
EOSINOPHIL # BLD AUTO: 0.07 K/MM3 (ref 0–0.68)
EOSINOPHIL NFR BLD AUTO: 2 % (ref 0–6)
ERYTHROCYTE [DISTWIDTH] IN BLOOD BY AUTOMATED COUNT: 16.8 % (ref 11.7–14.2)
GLOBULIN SER CALC-MCNC: 4.3 G/DL (ref 2.2–4)
GLUCOSE SERPL-MCNC: 300 MG/DL (ref 70–99)
HCT VFR BLD AUTO: 34.5 % (ref 37–53)
HGB BLD-MCNC: 11.3 G/DL (ref 13.5–17.5)
IMM GRANULOCYTES # BLD AUTO: 0.02 K/MM3 (ref 0–0.1)
IMM GRANULOCYTES NFR BLD AUTO: 1 % (ref 0–1)
LYMPHOCYTES # BLD AUTO: 0.79 K/MM3 (ref 0.84–5.2)
LYMPHOCYTES NFR BLD AUTO: 19 % (ref 21–46)
MCHC RBC AUTO-ENTMCNC: 32.8 G/DL (ref 31.5–36.5)
MCV RBC AUTO: 99 FL (ref 80–100)
MONOCYTES # BLD AUTO: 0.52 K/MM3 (ref 0.16–1.47)
MONOCYTES NFR BLD AUTO: 13 % (ref 4–13)
NEUTROPHILS # BLD AUTO: 2.68 K/MM3 (ref 1.96–9.15)
NEUTROPHILS NFR BLD AUTO: 65 % (ref 41–73)
NRBC # BLD AUTO: 0 K/MM3 (ref 0–0.02)
NRBC BLD AUTO-RTO: 0 /100 WBC (ref 0–0.2)
PLATELET # BLD AUTO: 212 K/MM3 (ref 150–400)
POTASSIUM SERPL-SCNC: 4.7 MMOL/L (ref 3.5–5.5)
PROT SERPL-MCNC: 7.7 G/DL (ref 6.4–8.2)
SODIUM SERPL-SCNC: 138 MMOL/L (ref 136–145)